# Patient Record
Sex: MALE | Race: AMERICAN INDIAN OR ALASKA NATIVE
[De-identification: names, ages, dates, MRNs, and addresses within clinical notes are randomized per-mention and may not be internally consistent; named-entity substitution may affect disease eponyms.]

---

## 2018-06-04 ENCOUNTER — HOSPITAL ENCOUNTER (EMERGENCY)
Dept: HOSPITAL 5 - ED | Age: 43
Discharge: LEFT BEFORE DISCHARGE | End: 2018-06-04
Payer: COMMERCIAL

## 2018-06-04 DIAGNOSIS — M54.2: ICD-10-CM

## 2018-06-04 DIAGNOSIS — H92.03: Primary | ICD-10-CM

## 2018-06-04 DIAGNOSIS — Z53.21: ICD-10-CM

## 2019-06-11 ENCOUNTER — HOSPITAL ENCOUNTER (EMERGENCY)
Dept: HOSPITAL 5 - ED | Age: 44
Discharge: HOME | End: 2019-06-11
Payer: COMMERCIAL

## 2019-06-11 VITALS — SYSTOLIC BLOOD PRESSURE: 154 MMHG | DIASTOLIC BLOOD PRESSURE: 106 MMHG

## 2019-06-11 DIAGNOSIS — Z88.0: ICD-10-CM

## 2019-06-11 DIAGNOSIS — F17.200: ICD-10-CM

## 2019-06-11 DIAGNOSIS — H60.91: Primary | ICD-10-CM

## 2019-06-11 PROCEDURE — 99282 EMERGENCY DEPT VISIT SF MDM: CPT

## 2019-06-11 NOTE — EMERGENCY DEPARTMENT REPORT
ED ENT HPI





- General


Chief complaint: Earache


Stated complaint: EAR ACHE


Time Seen by Provider: 06/11/19 07:23


Source: patient


Mode of arrival: Ambulatory


Limitations: No Limitations





- History of Present Illness


Initial comments: 





43-year-old -American male to emergency Department complaining of right 

ear pain with some discharge.  Went on a online virtual website which he 

received some eardrops which he did last for 24 hours per presents much 

department complaining that the pain has continued and will likely further 

evaluation.  No nausea, vomiting.  No tinnitus.  No presyncope.  No bleeding.  

No chest pain or palpitations, or sore throat.


MD complaint: ear pain (4)


-: Gradual


Location: R ear


Quality: dull


Consistency: constant


Improves with: none


Worsens with: none


Associated Symptoms: hearing loss, discharge from ear.  denies: pain with 

swallowing, sore throat





- Related Data


                                  Previous Rx's











 Medication  Instructions  Recorded  Last Taken  Type


 


Clindamycin [Clindamycin CAP] 300 mg PO Q8H #30 cap 07/25/14 Unknown Rx


 


Famotidine [Pepcid] 40 mg PO DAILY #14 tablet 07/25/14 Unknown Rx


 


Loratadine [Claritin] 10 mg PO DAILY #30 tablet 07/25/14 Unknown Rx


 


Prednisone [Prednisone 5 mg (6-Day 5 mg PO .TAPER #1 tab.ds.pk 07/25/14 Unknown 

Rx





Pack, 21 Tabs)]    


 


diphenhydrAMINE [Benadryl] 25 mg PO Q6H PRN #20 udc 07/25/14 Unknown Rx


 


Ciprofloxacin HCl [Ciprofloxacin 500 mg PO Q12HR #28 tab 06/11/19 Unknown Rx





TAB]    


 


Neomy/Polymyx B/Hc (Otic) Soln 4 drops AD TID #1 bottle 06/11/19 Unknown Rx





[Cortisporin (Otic) Soln]    











                                    Allergies











Allergy/AdvReac Type Severity Reaction Status Date / Time


 


Penicillins AdvReac  Unknown Verified 03/12/14 20:36














ED Dental HPI





- General


Chief complaint: Earache


Stated complaint: EAR ACHE


Time Seen by Provider: 06/11/19 07:23


Source: patient


Mode of arrival: Ambulatory


Limitations: No Limitations





- Related Data


                                  Previous Rx's











 Medication  Instructions  Recorded  Last Taken  Type


 


Clindamycin [Clindamycin CAP] 300 mg PO Q8H #30 cap 07/25/14 Unknown Rx


 


Famotidine [Pepcid] 40 mg PO DAILY #14 tablet 07/25/14 Unknown Rx


 


Loratadine [Claritin] 10 mg PO DAILY #30 tablet 07/25/14 Unknown Rx


 


Prednisone [Prednisone 5 mg (6-Day 5 mg PO .TAPER #1 tab.ds.pk 07/25/14 Unknown 

Rx





Pack, 21 Tabs)]    


 


diphenhydrAMINE [Benadryl] 25 mg PO Q6H PRN #20 udc 07/25/14 Unknown Rx


 


Ciprofloxacin HCl [Ciprofloxacin 500 mg PO Q12HR #28 tab 06/11/19 Unknown Rx





TAB]    


 


Neomy/Polymyx B/Hc (Otic) Soln 4 drops AD TID #1 bottle 06/11/19 Unknown Rx





[Cortisporin (Otic) Soln]    











                                    Allergies











Allergy/AdvReac Type Severity Reaction Status Date / Time


 


Penicillins AdvReac  Unknown Verified 03/12/14 20:36














ED Review of Systems


ROS: 


Stated complaint: EAR ACHE


Other details as noted in HPI





Constitutional: denies: chills, fever


Eyes: denies: eye pain, eye discharge, vision change


ENT: ear pain.  denies: throat pain


Respiratory: denies: cough, shortness of breath, wheezing


Cardiovascular: denies: chest pain, palpitations


Endocrine: no symptoms reported


Gastrointestinal: denies: abdominal pain, nausea, diarrhea


Genitourinary: denies: urgency, dysuria


Musculoskeletal: denies: back pain, joint swelling, arthralgia


Skin: denies: rash, lesions


Neurological: denies: headache, weakness, paresthesias


Psychiatric: denies: anxiety, depression


Hematological/Lymphatic: denies: easy bleeding, easy bruising





ED Past Medical Hx





- Past Medical History


Previous Medical History?: No





- Surgical History


Past Surgical History?: No





- Social History


Smoking Status: Current Every Day Smoker


Substance Use Type: Alcohol





- Medications


Home Medications: 


                                Home Medications











 Medication  Instructions  Recorded  Confirmed  Last Taken  Type


 


Clindamycin [Clindamycin CAP] 300 mg PO Q8H #30 cap 07/25/14  Unknown Rx


 


Famotidine [Pepcid] 40 mg PO DAILY #14 tablet 07/25/14  Unknown Rx


 


Loratadine [Claritin] 10 mg PO DAILY #30 tablet 07/25/14  Unknown Rx


 


Prednisone [Prednisone 5 mg (6-Day 5 mg PO .TAPER #1 tab.ds.pk 07/25/14  Unknown

 Rx





Pack, 21 Tabs)]     


 


diphenhydrAMINE [Benadryl] 25 mg PO Q6H PRN #20 udc 07/25/14  Unknown Rx


 


Ciprofloxacin HCl [Ciprofloxacin 500 mg PO Q12HR #28 tab 06/11/19  Unknown Rx





TAB]     


 


Neomy/Polymyx B/Hc (Otic) Soln 4 drops AD TID #1 bottle 06/11/19  Unknown Rx





[Cortisporin (Otic) Soln]     














ED Physical Exam





- General


Limitations: No Limitations


General appearance: alert, in no apparent distress





- Head


Head exam: Present: atraumatic, normocephalic





- Eye


Eye exam: Present: normal appearance





- ENT


ENT exam: Present: mucous membranes moist, other (right ear is swollen with some

discharge noted tragal tenderness is noted.  Tympanic membrane is intact.  No 

effusion.  No lymphadenopathy.  No mastoid tenderness.  Oropharynx is clear and 

airways patent)





- Neck


Neck exam: Present: normal inspection





- Respiratory


Respiratory exam: Present: normal lung sounds bilaterally.  Absent: respiratory 

distress





- Cardiovascular


Cardiovascular Exam: Present: regular rate, normal rhythm.  Absent: systolic 

murmur, diastolic murmur, rubs, gallop





- GI/Abdominal


GI/Abdominal exam: Present: soft, normal bowel sounds





- Rectal


Rectal exam: Present: deferred





- Extremities Exam


Extremities exam: Present: normal inspection





- Back Exam


Back exam: Present: normal inspection





- Neurological Exam


Neurological exam: Present: alert, oriented X3





- Psychiatric


Psychiatric exam: Present: normal affect, normal mood





- Skin


Skin exam: Present: warm, dry, intact, normal color.  Absent: rash





ED Course





                                   Vital Signs











  06/11/19





  06:48


 


Temperature 98.2 F


 


Pulse Rate 92 H


 


Respiratory 18





Rate 


 


Blood Pressure 154/106


 


O2 Sat by Pulse 97





Oximetry 














ED Medical Decision Making





- Medical Decision Making





A 43-year-old -American male with otitis external may be of a pseudomonal

origin for this reason, we'll add oral ciprofloxacin and Vicodin and advised him

on utilize an anti-inflammatory's as well.  He's been advised to continue the 

ofloxacin, but steroid laced medication may help as well.


Critical care attestation.: 


If time is entered above; I have spent that time in minutes in the direct care 

of this critically ill patient, excluding procedure time.








ED Disposition


Clinical Impression: 


 Otitis externa





Disposition: DC-01 TO HOME OR SELFCARE


Is pt being admited?: No


Does the pt Need Aspirin: No


Condition: Stable


Instructions:  Otitis Externa (ED)


Prescriptions: 


Ciprofloxacin HCl [Ciprofloxacin TAB] 500 mg PO Q12HR #28 tab


Neomy/Polymyx B/Hc (Otic) Soln [Cortisporin (Otic) Soln] 4 drops AD TID #1 

bottle


Referrals: 


PEARL CARIAS MD [Primary Care Provider] - 3-5 Days